# Patient Record
Sex: MALE | Race: WHITE | Employment: FULL TIME | URBAN - METROPOLITAN AREA
[De-identification: names, ages, dates, MRNs, and addresses within clinical notes are randomized per-mention and may not be internally consistent; named-entity substitution may affect disease eponyms.]

---

## 2023-10-16 ENCOUNTER — HOSPITAL ENCOUNTER (EMERGENCY)
Age: 28
Discharge: HOME | End: 2023-10-16
Attending: EMERGENCY MEDICINE
Payer: COMMERCIAL

## 2023-10-16 VITALS
OXYGEN SATURATION: 99 % | HEART RATE: 74 BPM | BODY MASS INDEX: 24.52 KG/M2 | TEMPERATURE: 98.1 F | SYSTOLIC BLOOD PRESSURE: 121 MMHG | HEIGHT: 72 IN | DIASTOLIC BLOOD PRESSURE: 64 MMHG | RESPIRATION RATE: 16 BRPM | WEIGHT: 181 LBS

## 2023-10-16 DIAGNOSIS — Z23 NEED FOR TETANUS BOOSTER: ICD-10-CM

## 2023-10-16 DIAGNOSIS — S61.209A AVULSION OF SKIN OF FINGER, INITIAL ENCOUNTER: Primary | ICD-10-CM

## 2023-10-16 PROCEDURE — 90471 IMMUNIZATION ADMIN: CPT

## 2023-10-16 PROCEDURE — 90715 TDAP VACCINE 7 YRS/> IM: CPT

## 2023-10-16 PROCEDURE — 99284 EMERGENCY DEPT VISIT MOD MDM: CPT

## 2023-10-16 PROCEDURE — 6360000002 HC RX W HCPCS

## 2023-10-16 RX ADMIN — TETANUS TOXOID, REDUCED DIPHTHERIA TOXOID AND ACELLULAR PERTUSSIS VACCINE, ADSORBED 0.5 ML: 5; 2.5; 8; 8; 2.5 SUSPENSION INTRAMUSCULAR at 21:44

## 2023-10-16 ASSESSMENT — PAIN - FUNCTIONAL ASSESSMENT: PAIN_FUNCTIONAL_ASSESSMENT: 0-10

## 2023-10-16 ASSESSMENT — PAIN SCALES - GENERAL: PAINLEVEL_OUTOF10: 1

## 2023-10-17 NOTE — ED PROVIDER NOTES
[facility] Name: Amanda Howell  MRN: [id number]  [facility] 1995  Date of evaluation: 10/16/2023  Provider: EMILEE Hernandez CNP    CHIEF COMPLAINT       Chief Complaint   Patient presents with    Laceration     R hand pinky finger laceration on a trailer door, needs tetanus         HISTORY OF PRESENT ILLNESS   (Location/Symptom, Timing/Onset, Context/Setting, Quality, Duration, Modifying Factors, Severity)  Note limiting factors. Amanda Howell is a 25 y.o. male who presents to the emergency department the emergency department for evaluation of right pinky finger finger abrasion/skin avulsion. Patient states he was moving objects and scraped his right pinky knuckle on the trailer door. He states the trailer door was sharp and tore off some skin on the knuckle of his right pinky finger. He states has not had a tetanus shot in over 16 years. Denies any crush injury or difficulty moving his pinky finger. He denies any other injuries or complaints. He denies any fevers or chills cough or cold symptoms. Nursing Notes were reviewed. REVIEW OF SYSTEMS       Constitutional: No fevers or chills   HEENT: No sore throat, rhinorrhea, or earache   Eyes: No blurry vision or double vision no drainage   Cardiovascular: No chest pain or tachycardia   Respiratory: No wheezing or shortness of breath no cough   Gastrointestinal: No nausea, vomiting, diarrhea, constipation, or abdominal pain   : No hematuria or dysuria   Musculoskeletal: No swelling or pain   Skin: No rash right pinky finger skin avulsion/abrasion  Neurological: No focal neurologic complaints, paresthesias, weakness, or headache      Except as noted above the remainder of the review of systems was reviewed and negative. PAST MEDICAL HISTORY   History reviewed. No pertinent past medical history. SURGICAL HISTORY     History reviewed.  No pertinent surgical

## 2023-10-17 NOTE — ED PROVIDER NOTES
[address] [facility]  eMERGENCY dEPARTMENT eNCOUnter   Independent Attestation     Pt Name: Joaquín Mckeon  MRN: [id number]  [address] [facility] 1995  Date of evaluation: 10/16/23       Joaquín Mckeon is a 25 y.o. male who presents with Laceration (R hand pinky finger laceration on a trailer door, needs tetanus)        Based on the medical record, the care appears appropriate. I was personally available for consultation in the Emergency Department.     Casey Castellon DO  Attending Emergency  Physician               Casey Castellon DO  10/16/23 [id number]

## 2023-10-17 NOTE — DISCHARGE INSTRUCTIONS
Keep wound clean and dry    Monitor wound for signs of infection including increased redness, swelling, purulent if any of the signs occur please see your family doctor, urgent care or emergency department for further treatment    Your tetanus was updated today    Please follow-up with occupational health for reevaluation    If any symptoms worsen or any new or concerning symptoms arise please return to emergency department